# Patient Record
Sex: MALE | Race: WHITE | Employment: FULL TIME | URBAN - METROPOLITAN AREA
[De-identification: names, ages, dates, MRNs, and addresses within clinical notes are randomized per-mention and may not be internally consistent; named-entity substitution may affect disease eponyms.]

---

## 2017-08-21 ENCOUNTER — OFFICE VISIT (OUTPATIENT)
Dept: FAMILY MEDICINE CLINIC | Age: 47
End: 2017-08-21

## 2017-08-21 VITALS
SYSTOLIC BLOOD PRESSURE: 134 MMHG | HEART RATE: 67 BPM | DIASTOLIC BLOOD PRESSURE: 86 MMHG | HEIGHT: 68 IN | BODY MASS INDEX: 31.81 KG/M2 | WEIGHT: 209.9 LBS | OXYGEN SATURATION: 98 %

## 2017-08-21 DIAGNOSIS — F41.9 ANXIETY: ICD-10-CM

## 2017-08-21 DIAGNOSIS — F33.42 RECURRENT MAJOR DEPRESSIVE DISORDER, IN FULL REMISSION (HCC): ICD-10-CM

## 2017-08-21 DIAGNOSIS — Z00.00 HEALTHCARE MAINTENANCE: Primary | ICD-10-CM

## 2017-08-21 DIAGNOSIS — F51.01 PRIMARY INSOMNIA: ICD-10-CM

## 2017-08-21 DIAGNOSIS — Z00.00 HEALTHCARE MAINTENANCE: ICD-10-CM

## 2017-08-21 LAB
ANION GAP SERPL CALCULATED.3IONS-SCNC: 15 MMOL/L (ref 3–16)
BUN BLDV-MCNC: 21 MG/DL (ref 7–20)
CALCIUM SERPL-MCNC: 9.5 MG/DL (ref 8.3–10.6)
CHLORIDE BLD-SCNC: 102 MMOL/L (ref 99–110)
CHOLESTEROL, TOTAL: 226 MG/DL (ref 0–199)
CO2: 25 MMOL/L (ref 21–32)
CREAT SERPL-MCNC: 1 MG/DL (ref 0.9–1.3)
GFR AFRICAN AMERICAN: >60
GFR NON-AFRICAN AMERICAN: >60
GLUCOSE BLD-MCNC: 86 MG/DL (ref 70–99)
HDLC SERPL-MCNC: 51 MG/DL (ref 40–60)
LDL CHOLESTEROL CALCULATED: 133 MG/DL
POTASSIUM SERPL-SCNC: 4.7 MMOL/L (ref 3.5–5.1)
SODIUM BLD-SCNC: 142 MMOL/L (ref 136–145)
TRIGL SERPL-MCNC: 212 MG/DL (ref 0–150)
VLDLC SERPL CALC-MCNC: 42 MG/DL

## 2017-08-21 PROCEDURE — 99386 PREV VISIT NEW AGE 40-64: CPT | Performed by: NURSE PRACTITIONER

## 2017-08-21 RX ORDER — DESVENLAFAXINE 50 MG/1
50 TABLET, EXTENDED RELEASE ORAL DAILY
COMMUNITY

## 2017-08-21 RX ORDER — CLONAZEPAM 0.5 MG/1
TABLET ORAL DAILY
COMMUNITY
Start: 2017-07-19

## 2017-08-21 ASSESSMENT — ENCOUNTER SYMPTOMS
SINUS PRESSURE: 0
DIARRHEA: 0
CHEST TIGHTNESS: 0
APNEA: 0
COUGH: 0
ABDOMINAL DISTENTION: 0
BLOOD IN STOOL: 0
SHORTNESS OF BREATH: 0
VOMITING: 0
EYE PAIN: 0
BACK PAIN: 0
CONSTIPATION: 0
NAUSEA: 0
EYE REDNESS: 0
RHINORRHEA: 0
WHEEZING: 0
ABDOMINAL PAIN: 0

## 2017-08-22 LAB
ESTIMATED AVERAGE GLUCOSE: 114 MG/DL
HBA1C MFR BLD: 5.6 %
HIV-1 AND HIV-2 ANTIBODIES: NORMAL

## 2017-08-28 ENCOUNTER — TELEPHONE (OUTPATIENT)
Dept: FAMILY MEDICINE CLINIC | Age: 47
End: 2017-08-28

## 2018-08-15 LAB
ALBUMIN: 5
ALP BLD-CCNC: 68 U/L
ALT SERPL-CCNC: 39 U/L
AST SERPL-CCNC: 39 U/L
AVERAGE GLUCOSE: NORMAL
CREATININE: 1.2 MG/DL
GAMMA GLUTAMYL TRANSFERASE: 32 U/L (ref 18–76)
GLUCOSE BLD-MCNC: 69 MG/DL
HBA1C MFR BLD: 5.3 %

## 2018-08-20 ENCOUNTER — OFFICE VISIT (OUTPATIENT)
Dept: FAMILY MEDICINE CLINIC | Age: 48
End: 2018-08-20

## 2018-08-20 VITALS
HEIGHT: 68 IN | DIASTOLIC BLOOD PRESSURE: 88 MMHG | BODY MASS INDEX: 31.04 KG/M2 | WEIGHT: 204.8 LBS | OXYGEN SATURATION: 99 % | HEART RATE: 64 BPM | SYSTOLIC BLOOD PRESSURE: 132 MMHG

## 2018-08-20 DIAGNOSIS — Z00.00 WELL ADULT EXAM: Primary | ICD-10-CM

## 2018-08-20 PROCEDURE — 99396 PREV VISIT EST AGE 40-64: CPT | Performed by: FAMILY MEDICINE

## 2018-08-20 ASSESSMENT — ENCOUNTER SYMPTOMS: RESPIRATORY NEGATIVE: 1

## 2018-08-20 NOTE — PROGRESS NOTES
Diagnosis Orders   1. Well adult exam            Plan:       Will work on diet and exercise   no high risk activities    Alen Concepcion MD

## 2018-08-23 ENCOUNTER — TELEPHONE (OUTPATIENT)
Dept: FAMILY MEDICINE CLINIC | Age: 48
End: 2018-08-23

## 2020-06-18 ENCOUNTER — TELEPHONE (OUTPATIENT)
Dept: ADMINISTRATIVE | Age: 50
End: 2020-06-18

## 2020-06-30 ENCOUNTER — TELEPHONE (OUTPATIENT)
Dept: FAMILY MEDICINE CLINIC | Age: 50
End: 2020-06-30

## 2020-06-30 NOTE — TELEPHONE ENCOUNTER
[facility] received a call from:    Name of Caller: Carol Jorge    Relationship to patient: Self    Best contact number: [phone]    Reason for call: Patient would like to reschedule Physical appointment in the office.  Please advise

## 2020-07-06 ENCOUNTER — OFFICE VISIT (OUTPATIENT)
Dept: FAMILY MEDICINE CLINIC | Age: 50
End: 2020-07-06
Payer: COMMERCIAL

## 2020-07-06 VITALS
BODY MASS INDEX: 30.31 KG/M2 | WEIGHT: 200 LBS | HEIGHT: 68 IN | DIASTOLIC BLOOD PRESSURE: 88 MMHG | SYSTOLIC BLOOD PRESSURE: 128 MMHG | OXYGEN SATURATION: 98 % | HEART RATE: 80 BPM

## 2020-07-06 PROCEDURE — 99396 PREV VISIT EST AGE 40-64: CPT | Performed by: FAMILY MEDICINE

## 2020-07-06 ASSESSMENT — ENCOUNTER SYMPTOMS: RESPIRATORY NEGATIVE: 1

## 2020-07-06 NOTE — PROGRESS NOTES
Subjective:      Patient ID: Lauren Coop is a 48 y.o. male. HPI   Pt is a of 48 y.o. male comes in today with   Chief Complaint   Patient presents with    Annual Exam    Health Maintenance     Colonoscopy (due). Here for physical.  Eats a lot of red meat. Other than that has been ok. A lot of activity at work but no dedicated exercise. Review of Systems   Constitutional: Negative. Respiratory: Negative. Cardiovascular: Negative. Vitals:    07/06/20 1543   BP: 128/88   Site: Left Upper Arm   Position: Sitting   Cuff Size: Medium Adult   Pulse: 80   SpO2: 98%   Weight: 200 lb (90.7 kg)   Height: 5' 8\" (1.727 m)       Objective:   Physical Exam  Constitutional:       Appearance: He is well-developed. HENT:      Head: Normocephalic and atraumatic. Right Ear: Tympanic membrane, ear canal and external ear normal.      Left Ear: Tympanic membrane, ear canal and external ear normal.   Eyes:      General: No scleral icterus. Conjunctiva/sclera: Conjunctivae normal.   Neck:      Musculoskeletal: Normal range of motion and neck supple. Thyroid: No thyromegaly. Cardiovascular:      Rate and Rhythm: Normal rate and regular rhythm. Heart sounds: Normal heart sounds. No murmur. Pulmonary:      Effort: Pulmonary effort is normal.      Breath sounds: Normal breath sounds. No wheezing or rales. Abdominal:      General: Bowel sounds are normal. There is no distension. Palpations: Abdomen is soft. There is no hepatomegaly or splenomegaly. Tenderness: There is no abdominal tenderness. Skin:     General: Skin is warm and dry. Neurological:      Mental Status: He is alert and oriented to person, place, and time. Cranial Nerves: No cranial nerve deficit. Deep Tendon Reflexes: Reflexes are normal and symmetric. Psychiatric:         Behavior: Behavior normal.         Thought Content:  Thought content normal.         Judgment: Judgment normal. Assessment:       Diagnosis Orders   1. Annual physical exam  Lipid Panel    Comprehensive Metabolic Panel          Plan:      Discussed statin use if ldl still high. Diet not likely to change.           Talisha Gong MD

## 2020-07-07 DIAGNOSIS — Z00.00 ANNUAL PHYSICAL EXAM: ICD-10-CM

## 2020-07-08 LAB
A/G RATIO: 2.1 (ref 1.1–2.2)
ALBUMIN SERPL-MCNC: 4.8 G/DL (ref 3.4–5)
ALP BLD-CCNC: 62 U/L (ref 40–129)
ALT SERPL-CCNC: 23 U/L (ref 10–40)
ANION GAP SERPL CALCULATED.3IONS-SCNC: 10 MMOL/L (ref 3–16)
AST SERPL-CCNC: 22 U/L (ref 15–37)
BILIRUB SERPL-MCNC: 0.5 MG/DL (ref 0–1)
BUN BLDV-MCNC: 25 MG/DL (ref 7–20)
CALCIUM SERPL-MCNC: 9.7 MG/DL (ref 8.3–10.6)
CHLORIDE BLD-SCNC: 101 MMOL/L (ref 99–110)
CHOLESTEROL, TOTAL: 225 MG/DL (ref 0–199)
CO2: 26 MMOL/L (ref 21–32)
CREAT SERPL-MCNC: 1 MG/DL (ref 0.9–1.3)
GFR AFRICAN AMERICAN: >60
GFR NON-AFRICAN AMERICAN: >60
GLOBULIN: 2.3 G/DL
GLUCOSE BLD-MCNC: 84 MG/DL (ref 70–99)
HDLC SERPL-MCNC: 53 MG/DL (ref 40–60)
LDL CHOLESTEROL CALCULATED: 139 MG/DL
POTASSIUM SERPL-SCNC: 4.5 MMOL/L (ref 3.5–5.1)
SODIUM BLD-SCNC: 137 MMOL/L (ref 136–145)
TOTAL PROTEIN: 7.1 G/DL (ref 6.4–8.2)
TRIGL SERPL-MCNC: 166 MG/DL (ref 0–150)
VLDLC SERPL CALC-MCNC: 33 MG/DL

## 2020-08-31 ENCOUNTER — TELEPHONE (OUTPATIENT)
Dept: FAMILY MEDICINE CLINIC | Age: 50
End: 2020-08-31

## 2021-10-21 ENCOUNTER — TELEPHONE (OUTPATIENT)
Dept: ORTHOPEDIC SURGERY | Age: 51
End: 2021-10-21

## 2021-10-21 ENCOUNTER — OFFICE VISIT (OUTPATIENT)
Dept: ORTHOPEDIC SURGERY | Age: 51
End: 2021-10-21
Payer: COMMERCIAL

## 2021-10-21 VITALS — HEIGHT: 68 IN | WEIGHT: 199.96 LBS | BODY MASS INDEX: 30.31 KG/M2

## 2021-10-21 DIAGNOSIS — G89.29 CHRONIC RIGHT HIP PAIN: ICD-10-CM

## 2021-10-21 DIAGNOSIS — M25.551 CHRONIC RIGHT HIP PAIN: ICD-10-CM

## 2021-10-21 DIAGNOSIS — M84.353A STRESS FRACTURE OF NECK OF FEMUR, INITIAL ENCOUNTER: Primary | ICD-10-CM

## 2021-10-21 PROCEDURE — 99203 OFFICE O/P NEW LOW 30 MIN: CPT | Performed by: INTERNAL MEDICINE

## 2021-10-21 PROCEDURE — G8427 DOCREV CUR MEDS BY ELIG CLIN: HCPCS | Performed by: INTERNAL MEDICINE

## 2021-10-21 PROCEDURE — 3017F COLORECTAL CA SCREEN DOC REV: CPT | Performed by: INTERNAL MEDICINE

## 2021-10-21 PROCEDURE — G8484 FLU IMMUNIZE NO ADMIN: HCPCS | Performed by: INTERNAL MEDICINE

## 2021-10-21 PROCEDURE — G8417 CALC BMI ABV UP PARAM F/U: HCPCS | Performed by: INTERNAL MEDICINE

## 2021-10-21 PROCEDURE — 1036F TOBACCO NON-USER: CPT | Performed by: INTERNAL MEDICINE

## 2021-10-21 NOTE — TELEPHONE ENCOUNTER
Called. L/m on v/m  Can be scheduled for 10/25 at [facility] or 10/27 at [facility].

## 2021-10-21 NOTE — TELEPHONE ENCOUNTER
The patient was wondering if he can get in sooner than his 27th appt. He was referred by Dr. Divya Horn.

## 2021-10-25 NOTE — PROGRESS NOTES
Chief Complaint:   Chief Complaint   Patient presents with    Hip Pain     NP Right hip pain/injury; femoral neck fx          History of Present Illness:       Patient is a 46 y.o. male presents with the above complaint. The symptoms began 7/11/2021 and started with an injury while waterskiing. At that time he was diagnosed with a nondisplaced fracture posterior lip of the right acetabulum by CT scan and injury consistent with a posterior hip subluxation event. Over time he noted gradual improvement of symptoms but increasing pain prompted MRI evaluation 10/11/2021. Findings were significant for subcortical stress/insufficiency fracture superior lateral femoral head/neck. He has been on crutches since that time but unfortunately his symptoms of pain remain problematic. He has been advised by his treating physician to remain nonweightbearing with crutches for an additional 5 weeks and consider surgical fixation as a treatment option if symptoms show no appreciable improvement. The patient describes a aching, throbbing pain that does not radiate. The symptoms are constant  and are show no change since the onset. Pain localizesl ateral and  does not seems to follow  provacative pattern suggestive of greater trochanteric pain syndrome. There are not mechanical symptoms that suggest a labral injury. The patient admits to subjective instability about the hip and admits to new onset or progressive weakness of the lower extremity. Pain level 5    The patient denies a pattern of activity related swelling. Treatment to date: Protected weightbearing with crutches    There is no prior history of autoimmune disease, autoimmune disease, or crystal arthropathy.      Past Medical History:        Past Medical History:   Diagnosis Date    History of severe allergy     3 shots weekly         Past Surgical History:   Procedure Laterality Date    HAND SURGERY      right hand    SEPTOPLASTY      TONSILLECTOMY AND ADENOIDECTOMY           Present Medications:         Current Outpatient Medications   Medication Sig Dispense Refill    clonazePAM (KLONOPIN) 0.5 MG tablet daily       desvenlafaxine succinate (PRISTIQ) 50 MG TB24 extended release tablet Take 50 mg by mouth daily       No current facility-administered medications for this visit. Allergies:      No Known Allergies     Social History:         Social History     Socioeconomic History    Marital status:      Spouse name: Not on file    Number of children: Not on file    Years of education: Not on file    Highest education level: Not on file   Occupational History    Not on file   Tobacco Use    Smoking status: Former Smoker     Quit date: 2/10/2011     Years since quitting: 10.7    Smokeless tobacco: Former User     Types: [address] date: 1/10/2012   Substance and Sexual Activity    Alcohol use: Yes     Alcohol/week: 12.0 standard drinks     Types: 12 Cans of beer per week     Comment: keeping under 2 per day    Drug use: No    Sexual activity: Yes     Partners: Female     Comment: wife   Other Topics Concern    Not on file   Social History Narrative    Not on file     Social Determinants of Health     Financial Resource Strain:     Difficulty of Paying Living Expenses:    Food Insecurity:     Worried About Running Out of Food in the Last Year:     Ran Out of Food in the Last Year:    Transportation Needs:     Lack of Transportation (Medical):      Lack of Transportation (Non-Medical):    Physical Activity:     Days of Exercise per Week:     Minutes of Exercise per Session:    Stress:     Feeling of Stress :    Social Connections:     Frequency of Communication with Friends and Family:     Frequency of Social Gatherings with Friends and Family:     Attends Faith Services:     Active Member of Clubs or Organizations:     Attends Club or Organization Meetings:     Marital Status:    Intimate Partner Violence:     Fear of Current or Ex-Partner:     Emotionally Abused:     Physically Abused:     Sexually Abused:         Review of Symptoms:    Pertinent items are noted in HPI    Review of systems reviewed from Patient History Form dated on today's date and   available in the patient's chart under the Media tab. Vital Signs: There were no vitals filed for this visit. General Exam:     Constitutional: Patient is adequately groomed with no evidence of malnutrition  Mental Status: The patient is oriented to time, place and person. The patient's mood and affect are appropriate. Vascular: Examination reveals no swelling or calf tenderness. Peripheral pulses are palpable and 2+. Lymphatics: no lymphadenopathy of the inguinal region or lower extremity      Physical Exam: right hip      Primary Exam:    Inspection: No deformity atrophy appreciable effusion      Palpation: No focal tenderness      Range of Motion: Mild pain inhibition global restriction      Strength: Normal with gravity active hip flexion low-grade discomfort      Special Tests: SLR negative      Skin: There are no rashes, ulcerations or lesions. Gait: Purposely not assessed      Reflex intact lower     Additional Comments:        Additional Examinations:           Left Lower Extremity: Examination of the left hip does not show any tenderness, deformity or injury. Range of motion is unremarkable. There is no gross instability. There are no rashes, ulcerations or lesions. Strength and tone are normal.   Neurolgic -Light touch sensation and manual muscle testing normal L2-S1. No fasiculations. Pattella tendon and Achilles tendon reflexes +2 bilaterally.   Seated SLR negative        Office Imaging Results/Procedures PerformedToday:            Office Procedures:     Orders Placed This Encounter   Procedures   Arabella Alba MD Orthopaedics and Sports Medicine, [facility]     Referral Priority:   Routine     Referral Type:   Eval and Treat     Referral Reason:   Specialty Services Required     Referred to Provider:   Denver Pulliam MD     Requested Specialty:   Orthopedic Surgery     Number of Visits Requested:   1           Other Outside Imaging and Testing Personally Reviewed:    No results found. Assessment   Impression: . Encounter Diagnoses   Name Primary?  Stress fracture of neck of femur, initial encounter Yes    Chronic right hip pain       Tension side stress fracture of proximal femur          Plan:       Recommend he proceed with fixation of the stress fracture for definitive management and discuss this with his treating physician-Dr. Reyes Tintah  Continue toe-touch weightbearing unload right lower extremity  Medical pain management as per previous    Given the location of the stress fracture and longevity of symptoms and continued disability I would recommend he proceed with definitive intervention. He will consider this as a treatment option and if he decides to proceed with more aggressive intervention he could consider consultation with our hip preservation team at his discretion. The nature of the finding, probable diagnosis and likely treatment was thoroughly discussed with the patient. The options, risks, complications, alternative treatment as well as some of the differential diagnosis was discussed. The patient was thoroughly informed and all questions were answered. the patient indicated understanding and satisfaction with the discussion. Orders:        Orders Placed This Encounter   Procedures   Josh Enriquez MD Orthopaedics and Sports Medicine, [facility]     Referral Priority:   Routine     Referral Type:   Eval and Treat     Referral Reason:   Specialty Services Required     Referred to Provider:   Denver Pulliam MD     Requested Specialty:   Orthopedic Surgery     Number of Visits Requested:   1           Disclaimer:     \"This note was dictated with voice recognition software. Though review and correction are routine, we apologize for any errors. \"

## 2021-10-26 ENCOUNTER — OFFICE VISIT (OUTPATIENT)
Dept: FAMILY MEDICINE CLINIC | Age: 51
End: 2021-10-26
Payer: COMMERCIAL

## 2021-10-26 VITALS
HEIGHT: 68 IN | SYSTOLIC BLOOD PRESSURE: 128 MMHG | WEIGHT: 210 LBS | BODY MASS INDEX: 31.83 KG/M2 | OXYGEN SATURATION: 99 % | DIASTOLIC BLOOD PRESSURE: 82 MMHG | HEART RATE: 85 BPM

## 2021-10-26 DIAGNOSIS — Z01.818 PREOP EXAMINATION: Primary | ICD-10-CM

## 2021-10-26 DIAGNOSIS — F41.9 ANXIETY: ICD-10-CM

## 2021-10-26 DIAGNOSIS — E66.09 CLASS 1 OBESITY DUE TO EXCESS CALORIES WITHOUT SERIOUS COMORBIDITY WITH BODY MASS INDEX (BMI) OF 31.0 TO 31.9 IN ADULT: ICD-10-CM

## 2021-10-26 PROCEDURE — G8417 CALC BMI ABV UP PARAM F/U: HCPCS | Performed by: FAMILY MEDICINE

## 2021-10-26 PROCEDURE — 99213 OFFICE O/P EST LOW 20 MIN: CPT | Performed by: FAMILY MEDICINE

## 2021-10-26 PROCEDURE — 93000 ELECTROCARDIOGRAM COMPLETE: CPT | Performed by: FAMILY MEDICINE

## 2021-10-26 PROCEDURE — G8484 FLU IMMUNIZE NO ADMIN: HCPCS | Performed by: FAMILY MEDICINE

## 2021-10-26 PROCEDURE — G8427 DOCREV CUR MEDS BY ELIG CLIN: HCPCS | Performed by: FAMILY MEDICINE

## 2021-10-26 SDOH — ECONOMIC STABILITY: FOOD INSECURITY: WITHIN THE PAST 12 MONTHS, THE FOOD YOU BOUGHT JUST DIDN'T LAST AND YOU DIDN'T HAVE MONEY TO GET MORE.: NEVER TRUE

## 2021-10-26 SDOH — ECONOMIC STABILITY: FOOD INSECURITY: WITHIN THE PAST 12 MONTHS, YOU WORRIED THAT YOUR FOOD WOULD RUN OUT BEFORE YOU GOT MONEY TO BUY MORE.: NEVER TRUE

## 2021-10-26 ASSESSMENT — SOCIAL DETERMINANTS OF HEALTH (SDOH): HOW HARD IS IT FOR YOU TO PAY FOR THE VERY BASICS LIKE FOOD, HOUSING, MEDICAL CARE, AND HEATING?: NOT HARD AT ALL

## 2021-10-26 ASSESSMENT — ENCOUNTER SYMPTOMS: RESPIRATORY NEGATIVE: 1

## 2021-10-26 NOTE — PROGRESS NOTES
Rigo Gordon (:  1970) is a 46 y.o. male,Established patient, here for evaluation of the following chief complaint(s):  Pre-op Exam (Patient is scheduled for right hip surgery on 21 at [facility] with Dr. Yanet Mcdonald.)         ASSESSMENT/PLAN:  Lauren Ramos was seen today for pre-op exam.    Diagnoses and all orders for this visit:    Preop examination  -     EKG 12 Lead  Medically patient is at acceptable risk to undergo planned surgery. Anxiety  The current medical regimen is effective;  continue present plan and medications. Class 1 obesity due to excess calories without serious comorbidity with body mass index (BMI) of 31.0 to 31.9 in adult  -     EKG 12 Lead  Stable. Once able to exercise will help. No follow-ups on file. Subjective   SUBJECTIVE/OBJECTIVE:  HPI   Pt is a of 46 y.o. male comes in today with   Chief Complaint   Patient presents with    Pre-op Exam     Patient is scheduled for right hip surgery on 21 at [facility] with Dr. Yanet Mcdonald. preop for right hip surgery  No personal or family history of adverse reaction to anesthesia or bleeding tendency. Vitals:    10/26/21 1333   BP: 128/82   Site: Right Upper Arm   Position: Sitting   Cuff Size: Medium Adult   Pulse: 85   SpO2: 99%   Weight: 210 lb (95.3 kg)   Height: 5' 7.99\" (1.727 m)     No Known Allergies    Current Outpatient Medications on File Prior to Visit   Medication Sig Dispense Refill    clonazePAM (KLONOPIN) 0.5 MG tablet daily       desvenlafaxine succinate (PRISTIQ) 50 MG TB24 extended release tablet Take 50 mg by mouth daily       No current facility-administered medications on file prior to visit.        Past Medical History:   Diagnosis Date    History of severe allergy     3 shots weekly       Past Surgical History:   Procedure Laterality Date    HAND SURGERY      right hand    SEPTOPLASTY      TONSILLECTOMY AND ADENOIDECTOMY         Social History Socioeconomic History    Marital status:      Spouse name: None    Number of children: None    Years of education: None    Highest education level: None   Occupational History    None   Tobacco Use    Smoking status: Former Smoker     Quit date: 2/10/2011     Years since quitting: 10.7    Smokeless tobacco: Former User     Types: [address] date: 1/10/2012   Substance and Sexual Activity    Alcohol use: Yes     Alcohol/week: 12.0 standard drinks     Types: 12 Cans of beer per week     Comment: keeping under 2 per day    Drug use: No    Sexual activity: Yes     Partners: Female     Comment: wife   Other Topics Concern    None   Social History Narrative    None     Social Determinants of Health     Financial Resource Strain: Low Risk     Difficulty of Paying Living Expenses: Not hard at all   Food Insecurity: No Food Insecurity    Worried About Running Out of Food in the Last Year: Never true    Reji of Food in the Last Year: Never true   Transportation Needs:     Lack of Transportation (Medical):  Lack of Transportation (Non-Medical):    Physical Activity:     Days of Exercise per Week:     Minutes of Exercise per Session:    Stress:     Feeling of Stress :    Social Connections:     Frequency of Communication with Friends and Family:     Frequency of Social Gatherings with Friends and Family:     Attends Gnosticism Services:     Active Member of Clubs or Organizations:     Attends Club or Organization Meetings:     Marital Status:    Intimate Partner Violence:     Fear of Current or Ex-Partner:     Emotionally Abused:     Physically Abused:     Sexually Abused:        Social History     Substance and Sexual Activity   Drug Use No       Family History   Problem Relation Age of Onset    High Blood Pressure Mother     High Blood Pressure Father         Review of Systems   Constitutional: Negative. Respiratory: Negative. Cardiovascular: Negative.     Hematological: Does not bruise/bleed easily. Objective   Physical Exam  Constitutional:       General: He is not in acute distress. Appearance: He is well-developed. He is not diaphoretic. HENT:      Head: Normocephalic and atraumatic. Eyes:      General: No scleral icterus. Neck:      Thyroid: No thyromegaly. Trachea: No tracheal deviation. Cardiovascular:      Rate and Rhythm: Normal rate and regular rhythm. Heart sounds: Normal heart sounds. No murmur heard. Pulmonary:      Effort: Pulmonary effort is normal.      Breath sounds: Normal breath sounds. Musculoskeletal:      Cervical back: Normal range of motion and neck supple. Lymphadenopathy:      Head:      Right side of head: No submental or submandibular adenopathy. Left side of head: No submental or submandibular adenopathy. Cervical: No cervical adenopathy. Skin:     General: Skin is warm and dry. Neurological:      Mental Status: He is alert and oriented to person, place, and time. Cranial Nerves: No cranial nerve deficit. Psychiatric:         Behavior: Behavior normal.         Thought Content: Thought content normal.         Judgment: Judgment normal.              An electronic signature was used to authenticate this note.     --Miguel Silveira MD

## 2021-11-02 ENCOUNTER — TELEPHONE (OUTPATIENT)
Dept: FAMILY MEDICINE CLINIC | Age: 51
End: 2021-11-02

## 2021-11-02 NOTE — TELEPHONE ENCOUNTER
Joy from [facility] called to have the pt pre-op physical faxed over to them.      Please advise     Fax # [phone]

## 2021-12-15 ENCOUNTER — CLINICAL DOCUMENTATION (OUTPATIENT)
Dept: OTHER | Age: 51
End: 2021-12-15

## 2022-10-24 ENCOUNTER — OFFICE VISIT (OUTPATIENT)
Dept: FAMILY MEDICINE CLINIC | Age: 52
End: 2022-10-24
Payer: COMMERCIAL

## 2022-10-24 VITALS
HEART RATE: 72 BPM | HEIGHT: 68 IN | SYSTOLIC BLOOD PRESSURE: 130 MMHG | BODY MASS INDEX: 31.52 KG/M2 | DIASTOLIC BLOOD PRESSURE: 84 MMHG | OXYGEN SATURATION: 98 % | WEIGHT: 208 LBS

## 2022-10-24 DIAGNOSIS — R06.83 SNORING: Primary | ICD-10-CM

## 2022-10-24 PROCEDURE — 99212 OFFICE O/P EST SF 10 MIN: CPT | Performed by: FAMILY MEDICINE

## 2022-10-24 PROCEDURE — 1036F TOBACCO NON-USER: CPT | Performed by: FAMILY MEDICINE

## 2022-10-24 PROCEDURE — G8417 CALC BMI ABV UP PARAM F/U: HCPCS | Performed by: FAMILY MEDICINE

## 2022-10-24 PROCEDURE — 3017F COLORECTAL CA SCREEN DOC REV: CPT | Performed by: FAMILY MEDICINE

## 2022-10-24 PROCEDURE — G8484 FLU IMMUNIZE NO ADMIN: HCPCS | Performed by: FAMILY MEDICINE

## 2022-10-24 PROCEDURE — G8427 DOCREV CUR MEDS BY ELIG CLIN: HCPCS | Performed by: FAMILY MEDICINE

## 2022-10-24 RX ORDER — NALTREXONE HYDROCHLORIDE 50 MG/1
50 TABLET, FILM COATED ORAL
COMMUNITY
Start: 2022-09-07

## 2022-10-24 ASSESSMENT — PATIENT HEALTH QUESTIONNAIRE - PHQ9
8. MOVING OR SPEAKING SO SLOWLY THAT OTHER PEOPLE COULD HAVE NOTICED. OR THE OPPOSITE, BEING SO FIGETY OR RESTLESS THAT YOU HAVE BEEN MOVING AROUND A LOT MORE THAN USUAL: 0
10. IF YOU CHECKED OFF ANY PROBLEMS, HOW DIFFICULT HAVE THESE PROBLEMS MADE IT FOR YOU TO DO YOUR WORK, TAKE CARE OF THINGS AT HOME, OR GET ALONG WITH OTHER PEOPLE: 0
SUM OF ALL RESPONSES TO PHQ QUESTIONS 1-9: 1
SUM OF ALL RESPONSES TO PHQ QUESTIONS 1-9: 1
9. THOUGHTS THAT YOU WOULD BE BETTER OFF DEAD, OR OF HURTING YOURSELF: 0
1. LITTLE INTEREST OR PLEASURE IN DOING THINGS: 0
3. TROUBLE FALLING OR STAYING ASLEEP: 1
SUM OF ALL RESPONSES TO PHQ9 QUESTIONS 1 & 2: 0
SUM OF ALL RESPONSES TO PHQ QUESTIONS 1-9: 1
4. FEELING TIRED OR HAVING LITTLE ENERGY: 0
2. FEELING DOWN, DEPRESSED OR HOPELESS: 0
SUM OF ALL RESPONSES TO PHQ QUESTIONS 1-9: 1
7. TROUBLE CONCENTRATING ON THINGS, SUCH AS READING THE NEWSPAPER OR WATCHING TELEVISION: 0
6. FEELING BAD ABOUT YOURSELF - OR THAT YOU ARE A FAILURE OR HAVE LET YOURSELF OR YOUR FAMILY DOWN: 0
5. POOR APPETITE OR OVEREATING: 0

## 2022-10-24 NOTE — PATIENT INSTRUCTIONS
Dr. Sidney Sánchez  [address] [place] [address], [address] [place] [address]  Get Directions  Tel: [phone]

## 2022-10-24 NOTE — PROGRESS NOTES
Lizzette Worthington (:  1970) is a 46 y.o. male,Established patient, here for evaluation of the following chief complaint(s):  Referral - General (At home sleep test)         ASSESSMENT/PLAN:  Tawnya Coburn was seen today for referral - general.    Diagnoses and all orders for this visit:    Snoring  Sounds like mod-severe so recommended follow up with sleep specialist     No follow-ups on file. Subjective   SUBJECTIVE/OBJECTIVE:  HPI  Pt is a of 46 y.o. male comes in today with   Chief Complaint   Patient presents with    Referral - General     At home sleep test     Snores and has apneic spells. Significant daytime somnolence. Vitals:    10/24/22 1500   BP: 130/84   Site: Right Upper Arm   Position: Sitting   Cuff Size: Medium Adult   Pulse: 72   SpO2: 98%   Weight: 208 lb (94.3 kg)   Height: 5' 8\" (1.727 m)      Review of Systems       Objective   Physical Exam         An electronic signature was used to authenticate this note.     --Aleksander Araiza MD

## 2022-11-03 ENCOUNTER — OFFICE VISIT (OUTPATIENT)
Dept: FAMILY MEDICINE CLINIC | Age: 52
End: 2022-11-03
Payer: COMMERCIAL

## 2022-11-03 VITALS
HEIGHT: 68 IN | OXYGEN SATURATION: 98 % | WEIGHT: 206 LBS | DIASTOLIC BLOOD PRESSURE: 82 MMHG | SYSTOLIC BLOOD PRESSURE: 138 MMHG | HEART RATE: 90 BPM | BODY MASS INDEX: 31.22 KG/M2

## 2022-11-03 DIAGNOSIS — G47.10 HYPERSOMNIA: Primary | ICD-10-CM

## 2022-11-03 DIAGNOSIS — R06.81 WITNESSED EPISODE OF APNEA: ICD-10-CM

## 2022-11-03 PROCEDURE — 1036F TOBACCO NON-USER: CPT | Performed by: STUDENT IN AN ORGANIZED HEALTH CARE EDUCATION/TRAINING PROGRAM

## 2022-11-03 PROCEDURE — 3017F COLORECTAL CA SCREEN DOC REV: CPT | Performed by: STUDENT IN AN ORGANIZED HEALTH CARE EDUCATION/TRAINING PROGRAM

## 2022-11-03 PROCEDURE — G8417 CALC BMI ABV UP PARAM F/U: HCPCS | Performed by: STUDENT IN AN ORGANIZED HEALTH CARE EDUCATION/TRAINING PROGRAM

## 2022-11-03 PROCEDURE — 99214 OFFICE O/P EST MOD 30 MIN: CPT | Performed by: STUDENT IN AN ORGANIZED HEALTH CARE EDUCATION/TRAINING PROGRAM

## 2022-11-03 PROCEDURE — G8427 DOCREV CUR MEDS BY ELIG CLIN: HCPCS | Performed by: STUDENT IN AN ORGANIZED HEALTH CARE EDUCATION/TRAINING PROGRAM

## 2022-11-03 PROCEDURE — G8482 FLU IMMUNIZE ORDER/ADMIN: HCPCS | Performed by: STUDENT IN AN ORGANIZED HEALTH CARE EDUCATION/TRAINING PROGRAM

## 2022-11-03 SDOH — ECONOMIC STABILITY: FOOD INSECURITY: WITHIN THE PAST 12 MONTHS, YOU WORRIED THAT YOUR FOOD WOULD RUN OUT BEFORE YOU GOT MONEY TO BUY MORE.: NEVER TRUE

## 2022-11-03 SDOH — ECONOMIC STABILITY: FOOD INSECURITY: WITHIN THE PAST 12 MONTHS, THE FOOD YOU BOUGHT JUST DIDN'T LAST AND YOU DIDN'T HAVE MONEY TO GET MORE.: NEVER TRUE

## 2022-11-03 ASSESSMENT — SLEEP AND FATIGUE QUESTIONNAIRES
HOW LIKELY ARE YOU TO NOD OFF OR FALL ASLEEP WHILE SITTING AND TALKING TO SOMEONE: 1
HOW LIKELY ARE YOU TO NOD OFF OR FALL ASLEEP WHILE LYING DOWN TO REST IN THE AFTERNOON WHEN CIRCUMSTANCES PERMIT: 2
HOW LIKELY ARE YOU TO NOD OFF OR FALL ASLEEP WHILE SITTING AND READING: 2
HOW LIKELY ARE YOU TO NOD OFF OR FALL ASLEEP IN A CAR, WHILE STOPPED FOR A FEW MINUTES IN TRAFFIC: 0
HOW LIKELY ARE YOU TO NOD OFF OR FALL ASLEEP WHILE WATCHING TV: 2
HOW LIKELY ARE YOU TO NOD OFF OR FALL ASLEEP WHEN YOU ARE A PASSENGER IN A CAR FOR AN HOUR WITHOUT A BREAK: 1
HOW LIKELY ARE YOU TO NOD OFF OR FALL ASLEEP WHILE SITTING INACTIVE IN A PUBLIC PLACE: 1
ESS TOTAL SCORE: 10
HOW LIKELY ARE YOU TO NOD OFF OR FALL ASLEEP WHILE SITTING QUIETLY AFTER LUNCH WITHOUT ALCOHOL: 1

## 2022-11-03 ASSESSMENT — SOCIAL DETERMINANTS OF HEALTH (SDOH): HOW HARD IS IT FOR YOU TO PAY FOR THE VERY BASICS LIKE FOOD, HOUSING, MEDICAL CARE, AND HEATING?: NOT HARD AT ALL

## 2022-11-03 NOTE — PATIENT INSTRUCTIONS
Patient information on the evaluation procedure for sleep apnea:    1. You are going to have a portable sleep study: This is a home sleep study that will be done to see if you have obstructive sleep apnea. You will have a flow monitor on your nose, belt on your chest and a oxygen/heart rate monitor on your finger. Please do not wear nail polish on day of the study as it will interfere with the oxygen reading probe that is placed on your finger during the study. Once you receive the device, you will also receive instructions on how to put it on and turn it on. After 2 nights you will return it. 2. The sleep office will call you with the results a week after the study. If the study showed that you have obstructive sleep apnea you will be told of the next step in your care. Commonly the recommended treatment is CPAP Therapy. If you agree to this therapy and you receive your CPAP before your next appointment, please bring it with you to your first follow-up appointment. Patients who have obstructive sleep apnea on the sleep study and have chosen to try CPAP:  A home equipment company will contact you to set you up with a CPAP machine and fit you for a mask. Please bring your CPAP, the mask and all supplies including the electrical cord with you to all your first follow up appointments with the sleep physician    Please keep trying to use your CPAP until you have seen in the sleep office in follow up as a lot of the problems patients have with CPAP can be addressed and corrected by your sleep provider. Sleep center contact information:  [facility]: [phone]  [facility]: [phone]             What are the risk factors for Obstructive Sleep Apnea (LYN)? Obesity  Snoring  Daytime sleepiness  Increasing age  Male gender  Taking sedating medications  Alcohol use  Hypertension  Stroke  Diabetes  Smoking     What is LYN?   People with LYN experience recurrent episodes during sleep when their throat closes or significantly narrows and they cannot inhale air into their lungs. This happens because the muscles that normally hold the throat open during wakefulness relax during sleep and allow it to narrow. When the muscles relax too much, trying to inhale can cause the throat to completely close and air cannot pass at all for at least 10 seconds. This is an obstructive apnea. An obstructive hypopnea occurs when the throat is partially closed for at least 10 seconds and airflow is decreased so much that oxygen in the blood starts to be fall. LYN is measured by how many apneas and hypopneas we have per hour. This is called an Apnea Hypopnea Index (AHI). It is considered excessive to have more than 5 apneas or hypopneas per hour as this can be extremely disruptive to sleep and have significant effects on our health and well being. How does LYN affect me? LYN goes beyond affecting just our quality of sleep. It interferes with many other systems of our body. Increase in blood pressure  Worsened control of diabetes  Daytime sleepiness and fatigue  Irritability  Difficulty concentrating or remembering facts  Difficulty losing weight  Swelling in the legs  Frequent awakenings to urinate  Increased risk of stroke  Increased risk of irregular heart rhythm  Increased risk for cardiovascular disease  Decreased sexual drive  Morning headaches  Increased risk of motor vehicle accident    How is LYN treated? The first line of treatment for LYN is continuous positive airway pressure (CPAP). A CPAP device provides air though a mask that fits over your nose or mouth and nose. The CPAP provides enough airflow to prevent the airway from collapsing when sleeping. This air can be humidified for comfort. Newer masks fit comfortably over the nasal opening rather than over the nose. It is important that CPAP is used regularly each night for optimal results.   Patients should notice improvement in sleepiness within the first week or two. Several second line therapies exist for LYN. Behavioral therapy for LYN includes weight loss and positional therapy which is avoidance of sleeping on ones back. Significant improvement in LYN can occur with as little as 10% weight loss. Dental devices that hold the lower jaw or tongue forward during sleep can also relieve LYN. These devices are not always as effective as CPAP but are preferred by some patients. Surgical procedures are also options. But it is often hard to predict how effective a surgical treatment will be in reducing or eliminating sleep apnea. Additional Information  American Academy of Sleep Medicine (www.aasmnet. org)    [address] (www.sleepfoundation. org)    American Sleep Apnea Association ([place]. org)    National Heart, Lung, and Blood [place] (www.nhlbi.nih.gov)    [employer]pto[employer] (www.uptodate.com/patients)  Weight Loss      Weight Loss is an important part of treating obstructive sleep apnea. Being at a healthy weight is improtant to prevent and/or facilitate treatment of chronic conditions such as heart disease and diabetes in addition to obstructive sleep apnea. This is optimally achieved through a healthy diet and exercise program that can be maintained long term. Drowsy Driving Tips    These suggestions will help prevent you from the risk of drowsy driving. 1.  If you feel tired or drowsy do not drive. Sleepiness is a major cause of motor vehicle accidents and accounts for 40% of all fatal crashes reported on the [address]. No matter how much you think you can control sleepiness, you can't.    2. Ensure you follow your doctor's advice about the treatment for your sleep disorder. For example, if you have sleep apnea and use CPAP, ensure you use it fully the night before your trip. 3. Get a good night's sleep before driving.   Do not reduce your sleep time if you plan a long drive the next day. Get to bed early and do not stay up late packing. 4. Avoid alcohol both the night before your trip and the during your trip. Alcohol will disrupt sleep and make you more tired the next day. Sleepiness and alcohol are additive in increasing impairment of your driving ability. 5. Avoid any sedative medications, including sedative antihistamines that are often contained in cold or allergy medications, the night before you drive as they may have long lasting effects the next day. 6. Travel during non-sleeping hours. Accidents due to sleepiness are more common during the nighttime hours. 7. If sleepy, stop and rest.  Drink coffee, walk around or have a brief nap in your car if you are sleepy. Have a 10-15 minute break after every 2 hours of driving. 8. Drive with a . Share the driving. Relax in the back seat until it is your time to share the driving again.

## 2022-11-03 NOTE — PROGRESS NOTES
Chief Complaint   Patient presents with    New Patient     Referred by Dr. Webb Prior     Snoring     Intense snoring, waking up through the night, wife says he wakes up gasping for air        Axel Johnson is a 46 y.o. male who comes in with complaints of snoring and gasping for air. Onset of symptoms was several years ago and has gotten worse as he's gotten older and heavier. Pertinent PMHx includes: severe allergies, anxiety and depression. He goes to bed at 10-10:30pm. He falls asleep in 30 minutes most days. He awakens at 4:30am.    He awakens several times per night (every half hour from 1 am to wake up time). He does not use medication for sleep: however takes clonazepam in the AM. He does not take daytime naps no. He describes the symptoms as snoring, waking up gasping for air, witnessed apnea, daytime fatigue and sleepiness. He denies hallucinations or sleep paralysis. He has not fallen asleep while driving. He does not have symptoms that fulfill the criteria for restless legs syndrome. Previous evaluation and treatment has included: none. Caffeinated drinks/day: 1/4 coffee  Occupation: wine   Family hx of sleep disorders: father, brother and sister with LYN    DATA REVIEWED TODAY:  [place] & FOSQ Questionnaires       Sleep Medicine 11/3/2022   Sitting and reading 2   Watching TV 2   Sitting, inactive in a public place (e.g. a theatre or a meeting) 1   As a passenger in a car for an hour without a break 1   Lying down to rest in the afternoon when circumstances permit 2   Sitting and talking to someone 1   Sitting quietly after a lunch without alcohol 1   In a car, while stopped for a few minutes in traffic 0   [place] Sleepiness Score 10   Neck circumference (Inches) 16.5     FOSQ total score:   Insomnia Severity Index score:       Physical Exam  Vitals:    11/03/22 1627   BP: 138/82   Pulse: 90   SpO2: 98%       General: alert and oriented, no apparent distress  HEENT:  Tongue: Ridging: does not   Overjet: does not  Retrognathia:does not  Tonsils: unknown  Mallampati Score: 4  Chest:  Auscultation: CTA, crackles, no; wheezing, no; nl excursion bilaterally  Heart:  RRR: No murmurs, rubs or gallops  Normal PMI  Skin: warm, no rashes    Ext:  Edema: No  Pulses: 2+ equal and bilateral  Neuro: alert and oriented         Assessment and Plan  The primary encounter diagnosis was Hypersomnia. A diagnosis of Witnessed episode of apnea was also pertinent to this visit. Orders Placed This Encounter   Procedures    Home Sleep Study          Mortimer Cross is a 46 y.o. male with pertinent PMHx of severe allergies, anxiety and depression, presenting with symptoms of hypersomnia and witnessed apneas. He has several risk factors for sleep disordered breathing including snoring, witnessed apneas, daytime sleepiness and fatigue. I will order a home sleep test to further evaluate this. We discussed treatment options and he is willing to consider CPAP treatment. If the study is positive for obstructive sleep apnea, we will therefore proceed with auto CPAP unless in lab PAP titration is indicated based on the sleep study. He understands that untreated LYN is associated with heart failure, atrial fibrillation, strokes, HTN, and impaired glucose tolerance. He should avoid respiratory suppressants as these can worsen sleep disordered breathing. He should never drive if drowsy and should pull over at a safe place if he becomes drowsy while driving. A handout on drowsy driving tips was given to the patient. Weight loss is associated with improvement in sleep disordered breathing. Mortimer Cross should exercise regularly and watch his diet. Return if symptoms worsen or fail to improve. . Will schedule patient for follow-up if test is positive for LYN.     Jair Sommer MD  1:51 PM

## 2023-02-01 ENCOUNTER — TELEPHONE (OUTPATIENT)
Dept: FAMILY MEDICINE CLINIC | Age: 53
End: 2023-02-01

## 2023-02-01 NOTE — TELEPHONE ENCOUNTER
Pt requesting a Rx to receive a Home Study Test from [facility]. Pt states it was discussed at his last appt that he needed to completed a Sleep Study. Pt was referred to Dr. Madelyne Duane by Dr. Margarito Callaway, but states it will cost him over a $1000 to get the equipment. Pt states their phone number is [phone] and [email or web address]. Pt states if he has a Rx, the machine may be covered. Please call and advise upon completion.

## 2023-05-04 ENCOUNTER — TELEPHONE (OUTPATIENT)
Dept: FAMILY MEDICINE CLINIC | Age: 53
End: 2023-05-04

## 2023-05-04 NOTE — TELEPHONE ENCOUNTER
----- Message from Hari Guerrero sent at 5/4/2023  1:15 PM EDT -----  Subject: Referral Request    Reason for referral request? Patient needs another prescription or   referral for a home sleep test sent to [facility]. The fax number is   [phone]. Please call patient when referral is faxed. Provider patient wants to be referred to(if known):     Provider Phone Number(if known):     Additional Information for Provider?   ---------------------------------------------------------------------------  --------------  [facility]    [phone]; OK to leave message on voicemail  ---------------------------------------------------------------------------  --------------

## 2023-06-27 ENCOUNTER — TELEPHONE (OUTPATIENT)
Dept: PULMONOLOGY | Age: 53
End: 2023-06-27

## 2023-07-26 ENCOUNTER — OFFICE VISIT (OUTPATIENT)
Dept: FAMILY MEDICINE CLINIC | Age: 53
End: 2023-07-26
Payer: COMMERCIAL

## 2023-07-26 VITALS
OXYGEN SATURATION: 98 % | WEIGHT: 203 LBS | HEART RATE: 83 BPM | DIASTOLIC BLOOD PRESSURE: 92 MMHG | HEIGHT: 68 IN | BODY MASS INDEX: 30.77 KG/M2 | SYSTOLIC BLOOD PRESSURE: 150 MMHG

## 2023-07-26 DIAGNOSIS — I10 BENIGN HYPERTENSION: ICD-10-CM

## 2023-07-26 DIAGNOSIS — E78.2 MIXED HYPERLIPIDEMIA: Primary | ICD-10-CM

## 2023-07-26 DIAGNOSIS — R06.83 SNORING: ICD-10-CM

## 2023-07-26 PROCEDURE — G8417 CALC BMI ABV UP PARAM F/U: HCPCS | Performed by: FAMILY MEDICINE

## 2023-07-26 PROCEDURE — 3078F DIAST BP <80 MM HG: CPT | Performed by: FAMILY MEDICINE

## 2023-07-26 PROCEDURE — 3074F SYST BP LT 130 MM HG: CPT | Performed by: FAMILY MEDICINE

## 2023-07-26 PROCEDURE — 1036F TOBACCO NON-USER: CPT | Performed by: FAMILY MEDICINE

## 2023-07-26 PROCEDURE — 99214 OFFICE O/P EST MOD 30 MIN: CPT | Performed by: FAMILY MEDICINE

## 2023-07-26 PROCEDURE — G8427 DOCREV CUR MEDS BY ELIG CLIN: HCPCS | Performed by: FAMILY MEDICINE

## 2023-07-26 PROCEDURE — 3017F COLORECTAL CA SCREEN DOC REV: CPT | Performed by: FAMILY MEDICINE

## 2023-07-26 RX ORDER — AMLODIPINE BESYLATE 5 MG/1
5 TABLET ORAL DAILY
Qty: 30 TABLET | Refills: 0 | Status: SHIPPED | OUTPATIENT
Start: 2023-07-26

## 2023-07-26 SDOH — ECONOMIC STABILITY: HOUSING INSECURITY
IN THE LAST 12 MONTHS, WAS THERE A TIME WHEN YOU DID NOT HAVE A STEADY PLACE TO SLEEP OR SLEPT IN A SHELTER (INCLUDING NOW)?: NO

## 2023-07-26 SDOH — ECONOMIC STABILITY: FOOD INSECURITY: WITHIN THE PAST 12 MONTHS, YOU WORRIED THAT YOUR FOOD WOULD RUN OUT BEFORE YOU GOT MONEY TO BUY MORE.: NEVER TRUE

## 2023-07-26 SDOH — ECONOMIC STABILITY: INCOME INSECURITY: HOW HARD IS IT FOR YOU TO PAY FOR THE VERY BASICS LIKE FOOD, HOUSING, MEDICAL CARE, AND HEATING?: NOT HARD AT ALL

## 2023-07-26 SDOH — ECONOMIC STABILITY: FOOD INSECURITY: WITHIN THE PAST 12 MONTHS, THE FOOD YOU BOUGHT JUST DIDN'T LAST AND YOU DIDN'T HAVE MONEY TO GET MORE.: NEVER TRUE

## 2023-07-26 NOTE — PROGRESS NOTES
Regla Escoto (:  1970) is a 48 y.o. male,Established patient, here for evaluation of the following chief complaint(s):  Follow-up (Patient would like to review wellness results for insurance.)         ASSESSMENT/PLAN:  Cassidy Rangel was seen today for follow-up. Diagnoses and all orders for this visit:    Benign hypertension  blood pressure up today but well controlled at home  Treatment of shirley will help  Continue amlodipine  Snoring  Encouraged follow up for this since not treated  Mixed hyperlipidemia  Not well controlled  Will work on therapeutic lifestyle changes   Other orders  -     amLODIPine (NORVASC) 5 MG tablet; Take 1 tablet by mouth daily         No follow-ups on file. Subjective   SUBJECTIVE/OBJECTIVE:  HPI  Pt is a of 48 y.o. male comes in today with   Chief Complaint   Patient presents with    Follow-up     Patient would like to review wellness results for insurance. Had life insurance blood work   LDL was 149. Rest of blood work and blood sugar was normal.  blood pressure was 130/80 at work physical.  Saw Dr. Johana Willingham in November but hasn't had sleep study done. Vitals:    23 1532   BP: (!) 150/92   Site: Left Upper Arm   Position: Sitting   Cuff Size: Medium Adult   Pulse: 83   SpO2: 98%   Weight: 203 lb (92.1 kg)   Height: 5' 8\" (1.727 m)        Past Medical History:Reviewed  Medications:Reviewed. No Known Allergies   Social hx:Reviewed. Social History     Tobacco Use   Smoking Status Former    Types: Cigarettes   Smokeless Tobacco Former    Types: Chew    Quit date: 1/10/2012        Review of Systems       Objective   Physical Exam  Constitutional:       Appearance: Normal appearance. Cardiovascular:      Rate and Rhythm: Normal rate and regular rhythm. Pulmonary:      Effort: Pulmonary effort is normal.      Breath sounds: Normal breath sounds. Neurological:      Mental Status: He is alert.             An electronic signature was used to authenticate this

## 2023-07-27 ASSESSMENT — PATIENT HEALTH QUESTIONNAIRE - PHQ9
SUM OF ALL RESPONSES TO PHQ QUESTIONS 1-9: 0
5. POOR APPETITE OR OVEREATING: 0
1. LITTLE INTEREST OR PLEASURE IN DOING THINGS: 0
SUM OF ALL RESPONSES TO PHQ QUESTIONS 1-9: 0
4. FEELING TIRED OR HAVING LITTLE ENERGY: 0
SUM OF ALL RESPONSES TO PHQ QUESTIONS 1-9: 0
SUM OF ALL RESPONSES TO PHQ QUESTIONS 1-9: 0
3. TROUBLE FALLING OR STAYING ASLEEP: 0
9. THOUGHTS THAT YOU WOULD BE BETTER OFF DEAD, OR OF HURTING YOURSELF: 0
10. IF YOU CHECKED OFF ANY PROBLEMS, HOW DIFFICULT HAVE THESE PROBLEMS MADE IT FOR YOU TO DO YOUR WORK, TAKE CARE OF THINGS AT HOME, OR GET ALONG WITH OTHER PEOPLE: 0
7. TROUBLE CONCENTRATING ON THINGS, SUCH AS READING THE NEWSPAPER OR WATCHING TELEVISION: 0
8. MOVING OR SPEAKING SO SLOWLY THAT OTHER PEOPLE COULD HAVE NOTICED. OR THE OPPOSITE, BEING SO FIGETY OR RESTLESS THAT YOU HAVE BEEN MOVING AROUND A LOT MORE THAN USUAL: 0
6. FEELING BAD ABOUT YOURSELF - OR THAT YOU ARE A FAILURE OR HAVE LET YOURSELF OR YOUR FAMILY DOWN: 0
2. FEELING DOWN, DEPRESSED OR HOPELESS: 0
SUM OF ALL RESPONSES TO PHQ9 QUESTIONS 1 & 2: 0

## 2024-05-13 ENCOUNTER — TELEPHONE (OUTPATIENT)
Dept: FAMILY MEDICINE CLINIC | Age: 54
End: 2024-05-13

## 2024-05-13 NOTE — TELEPHONE ENCOUNTER
Pt is having foot surgery on Monday and Iris with Dr. Cunningham wants to know if Pt should have a stronger blood thinner since both his sisters were tested and have Factor 5. She was wondering about Misael or Dari she can be contacted at [phone] also the fax is [phone].

## 2024-05-13 NOTE — TELEPHONE ENCOUNTER
I I left message on patient's machine to call back.  Pt probably due for follow up-can he be scheduled to discuss

## 2024-06-05 ASSESSMENT — PATIENT HEALTH QUESTIONNAIRE - PHQ9
6. FEELING BAD ABOUT YOURSELF - OR THAT YOU ARE A FAILURE OR HAVE LET YOURSELF OR YOUR FAMILY DOWN: NOT AT ALL
SUM OF ALL RESPONSES TO PHQ9 QUESTIONS 1 & 2: 4
SUM OF ALL RESPONSES TO PHQ QUESTIONS 1-9: 10
9. THOUGHTS THAT YOU WOULD BE BETTER OFF DEAD, OR OF HURTING YOURSELF: NOT AT ALL
1. LITTLE INTEREST OR PLEASURE IN DOING THINGS: MORE THAN HALF THE DAYS
1. LITTLE INTEREST OR PLEASURE IN DOING THINGS: MORE THAN HALF THE DAYS
8. MOVING OR SPEAKING SO SLOWLY THAT OTHER PEOPLE COULD HAVE NOTICED. OR THE OPPOSITE - BEING SO FIDGETY OR RESTLESS THAT YOU HAVE BEEN MOVING AROUND A LOT MORE THAN USUAL: NOT AT ALL
4. FEELING TIRED OR HAVING LITTLE ENERGY: MORE THAN HALF THE DAYS
9. THOUGHTS THAT YOU WOULD BE BETTER OFF DEAD, OR OF HURTING YOURSELF: NOT AT ALL
SUM OF ALL RESPONSES TO PHQ QUESTIONS 1-9: 10
5. POOR APPETITE OR OVEREATING: MORE THAN HALF THE DAYS
4. FEELING TIRED OR HAVING LITTLE ENERGY: MORE THAN HALF THE DAYS
2. FEELING DOWN, DEPRESSED OR HOPELESS: MORE THAN HALF THE DAYS
3. TROUBLE FALLING OR STAYING ASLEEP: MORE THAN HALF THE DAYS
2. FEELING DOWN, DEPRESSED OR HOPELESS: MORE THAN HALF THE DAYS
SUM OF ALL RESPONSES TO PHQ QUESTIONS 1-9: 10
3. TROUBLE FALLING OR STAYING ASLEEP: MORE THAN HALF THE DAYS
5. POOR APPETITE OR OVEREATING: MORE THAN HALF THE DAYS
10. IF YOU CHECKED OFF ANY PROBLEMS, HOW DIFFICULT HAVE THESE PROBLEMS MADE IT FOR YOU TO DO YOUR WORK, TAKE CARE OF THINGS AT HOME, OR GET ALONG WITH OTHER PEOPLE: SOMEWHAT DIFFICULT
SUM OF ALL RESPONSES TO PHQ QUESTIONS 1-9: 10
7. TROUBLE CONCENTRATING ON THINGS, SUCH AS READING THE NEWSPAPER OR WATCHING TELEVISION: NOT AT ALL
10. IF YOU CHECKED OFF ANY PROBLEMS, HOW DIFFICULT HAVE THESE PROBLEMS MADE IT FOR YOU TO DO YOUR WORK, TAKE CARE OF THINGS AT HOME, OR GET ALONG WITH OTHER PEOPLE: SOMEWHAT DIFFICULT
6. FEELING BAD ABOUT YOURSELF - OR THAT YOU ARE A FAILURE OR HAVE LET YOURSELF OR YOUR FAMILY DOWN: NOT AT ALL
7. TROUBLE CONCENTRATING ON THINGS, SUCH AS READING THE NEWSPAPER OR WATCHING TELEVISION: NOT AT ALL
8. MOVING OR SPEAKING SO SLOWLY THAT OTHER PEOPLE COULD HAVE NOTICED. OR THE OPPOSITE, BEING SO FIGETY OR RESTLESS THAT YOU HAVE BEEN MOVING AROUND A LOT MORE THAN USUAL: NOT AT ALL
SUM OF ALL RESPONSES TO PHQ QUESTIONS 1-9: 10

## 2024-06-06 ENCOUNTER — OFFICE VISIT (OUTPATIENT)
Dept: FAMILY MEDICINE CLINIC | Age: 54
End: 2024-06-06
Payer: COMMERCIAL

## 2024-06-06 VITALS
OXYGEN SATURATION: 98 % | HEART RATE: 90 BPM | BODY MASS INDEX: 31.22 KG/M2 | HEIGHT: 68 IN | WEIGHT: 206 LBS | SYSTOLIC BLOOD PRESSURE: 122 MMHG | DIASTOLIC BLOOD PRESSURE: 60 MMHG

## 2024-06-06 DIAGNOSIS — I10 BENIGN HYPERTENSION: ICD-10-CM

## 2024-06-06 DIAGNOSIS — Z00.00 WELL ADULT EXAM: Primary | ICD-10-CM

## 2024-06-06 DIAGNOSIS — E78.2 MIXED HYPERLIPIDEMIA: ICD-10-CM

## 2024-06-06 PROCEDURE — 90471 IMMUNIZATION ADMIN: CPT | Performed by: FAMILY MEDICINE

## 2024-06-06 PROCEDURE — 3074F SYST BP LT 130 MM HG: CPT | Performed by: FAMILY MEDICINE

## 2024-06-06 PROCEDURE — 90715 TDAP VACCINE 7 YRS/> IM: CPT | Performed by: FAMILY MEDICINE

## 2024-06-06 PROCEDURE — 3078F DIAST BP <80 MM HG: CPT | Performed by: FAMILY MEDICINE

## 2024-06-06 PROCEDURE — 99396 PREV VISIT EST AGE 40-64: CPT | Performed by: FAMILY MEDICINE

## 2024-06-06 RX ORDER — ROSUVASTATIN CALCIUM 5 MG/1
5 TABLET, COATED ORAL DAILY
Qty: 90 TABLET | Refills: 3 | Status: SHIPPED | OUTPATIENT
Start: 2024-06-06 | End: 2024-06-06

## 2024-06-06 RX ORDER — ROSUVASTATIN CALCIUM 5 MG/1
5 TABLET, COATED ORAL DAILY
Qty: 90 TABLET | Refills: 3 | Status: SHIPPED | OUTPATIENT
Start: 2024-06-06

## 2024-06-06 NOTE — PROGRESS NOTES
Ulices Oseguera (:  1970) is a 54 y.o. male,Established patient, here for evaluation of the following chief complaint(s):  Annual Exam      Assessment & Plan   ASSESSMENT/PLAN:  Ulices was seen today for annual exam.    Diagnoses and all orders for this visit:    Well adult exam  -     Lipid Panel; Future  -     Comprehensive Metabolic Panel; Future  Reviewed diet and exercise  Benign hypertension  Stable off amlodipine. Will monitor  Mixed hyperlipidemia  Low dose statin indicated  Other orders  -     Discontinue: rosuvastatin (CRESTOR) 5 MG tablet; Take 1 tablet by mouth daily  -     rosuvastatin (CRESTOR) 5 MG tablet; Take 1 tablet by mouth daily  -     Tdap, BOOSTRIX, (age 10 yrs+), IM         No follow-ups on file.         Subjective   SUBJECTIVE/OBJECTIVE:  HPI  Pt is a of 54 y.o. male comes in today with   Chief Complaint   Patient presents with    Annual Exam   Here for annual  Vitals:    24 0923   BP: 122/60   Pulse: 90   SpO2: 98%   Weight: 93.4 kg (206 lb)   Height: 1.727 m (5' 8\")       Past Medical History:Reviewed  Medications:Reviewed.  No Known Allergies   Social hx:Reviewed.  Social History     Tobacco Use   Smoking Status Former    Types: Cigarettes   Smokeless Tobacco Former    Types: Chew    Quit date: 1/10/2012        Review of Systems       Objective   Physical Exam  Constitutional:       Appearance: Normal appearance. He is well-developed.   HENT:      Head: Normocephalic and atraumatic.      Mouth/Throat:      Pharynx: Oropharynx is clear.   Eyes:      General: No scleral icterus.     Conjunctiva/sclera: Conjunctivae normal.   Neck:      Thyroid: No thyromegaly.   Cardiovascular:      Rate and Rhythm: Normal rate and regular rhythm.      Heart sounds: Normal heart sounds. No murmur heard.  Pulmonary:      Effort: Pulmonary effort is normal.      Breath sounds: Normal breath sounds. No wheezing or rales.   Abdominal:      General: Bowel sounds are normal. There is no

## 2024-12-20 ENCOUNTER — OFFICE VISIT (OUTPATIENT)
Dept: FAMILY MEDICINE CLINIC | Age: 54
End: 2024-12-20
Payer: COMMERCIAL

## 2024-12-20 VITALS
BODY MASS INDEX: 31.83 KG/M2 | HEART RATE: 63 BPM | HEIGHT: 68 IN | DIASTOLIC BLOOD PRESSURE: 93 MMHG | WEIGHT: 210 LBS | SYSTOLIC BLOOD PRESSURE: 147 MMHG | OXYGEN SATURATION: 97 %

## 2024-12-20 DIAGNOSIS — M54.50 CHRONIC BILATERAL LOW BACK PAIN WITHOUT SCIATICA: ICD-10-CM

## 2024-12-20 DIAGNOSIS — G89.29 CHRONIC NECK PAIN: ICD-10-CM

## 2024-12-20 DIAGNOSIS — G89.29 CHRONIC BILATERAL LOW BACK PAIN WITHOUT SCIATICA: ICD-10-CM

## 2024-12-20 DIAGNOSIS — L30.8 OTHER ECZEMA: Primary | ICD-10-CM

## 2024-12-20 DIAGNOSIS — Z83.2 FAMILY HISTORY OF FACTOR V DEFICIENCY: ICD-10-CM

## 2024-12-20 DIAGNOSIS — B35.6 TINEA CRURIS: ICD-10-CM

## 2024-12-20 DIAGNOSIS — E78.2 MIXED HYPERLIPIDEMIA: ICD-10-CM

## 2024-12-20 DIAGNOSIS — M54.2 CHRONIC NECK PAIN: ICD-10-CM

## 2024-12-20 PROCEDURE — 99214 OFFICE O/P EST MOD 30 MIN: CPT | Performed by: FAMILY MEDICINE

## 2024-12-20 RX ORDER — KETOCONAZOLE 20 MG/G
CREAM TOPICAL 2 TIMES DAILY
Qty: 30 G | Refills: 1 | Status: SHIPPED | OUTPATIENT
Start: 2024-12-20

## 2024-12-20 RX ORDER — TRIAMCINOLONE ACETONIDE 1 MG/G
OINTMENT TOPICAL
Qty: 80 G | Refills: 1 | Status: SHIPPED | OUTPATIENT
Start: 2024-12-20 | End: 2024-12-27

## 2024-12-20 SDOH — ECONOMIC STABILITY: FOOD INSECURITY: WITHIN THE PAST 12 MONTHS, THE FOOD YOU BOUGHT JUST DIDN'T LAST AND YOU DIDN'T HAVE MONEY TO GET MORE.: NEVER TRUE

## 2024-12-20 SDOH — ECONOMIC STABILITY: FOOD INSECURITY: WITHIN THE PAST 12 MONTHS, YOU WORRIED THAT YOUR FOOD WOULD RUN OUT BEFORE YOU GOT MONEY TO BUY MORE.: NEVER TRUE

## 2024-12-20 SDOH — ECONOMIC STABILITY: INCOME INSECURITY: HOW HARD IS IT FOR YOU TO PAY FOR THE VERY BASICS LIKE FOOD, HOUSING, MEDICAL CARE, AND HEATING?: NOT HARD AT ALL

## 2024-12-20 NOTE — PROGRESS NOTES
Ulices Oseguera (:  1970) is a 54 y.o. male,Established patient, here for evaluation of the following chief complaint(s):  Lower Back Pain, Skin Problem (On arms/6-8 weeks), Neck Pain, and Rash (Groin area)      Assessment & Plan   ASSESSMENT/PLAN:  Ulices was seen today for lower back pain, skin problem, neck pain and rash.    Diagnoses and all orders for this visit:    Other eczema  Not well controlled. Covering with topical  Tinea cruris  Acute, not improving.  Trial of ketoconazole  Family history of factor V deficiency  -     Factor 5 Activity; Future    Mixed hyperlipidemia  Stable on statin  Chronic neck pain and Chronic bilateral low back pain without sciatica  Not well controlled. Wants  to work on HEP      Other orders  -     triamcinolone (KENALOG) 0.1 % ointment; Apply topically 2 times daily.  -     ketoconazole (NIZORAL) 2 % cream; Apply topically 2 times daily Apply topically daily.         No follow-ups on file.         Subjective   SUBJECTIVE/OBJECTIVE:  HPI  Pt is a of 54 y.o. male comes in today with   Chief Complaint   Patient presents with    Lower Back Pain    Skin Problem     On arms  6-8 weeks    Neck Pain    Rash     Groin area     Neck and lower back pain.   Does a lot of lifting. Spending time in his car.  Arms have been itchy for 4-6 weeks.  Putting hydrocortisone which helps a little.  Rash in groin area.  Off and on for months.  Clotrimazole seemed to help initially but recurred.  Both sisters tested positive for factor v      Review of Systems       Objective   Physical Exam     Excoriated papules and dry skin ble  Groin circular patches not localized to folds    An electronic signature was used to authenticate this note.    --Virgilio Sweeney MD

## 2025-06-16 RX ORDER — ROSUVASTATIN CALCIUM 5 MG/1
5 TABLET, COATED ORAL DAILY
Qty: 90 TABLET | Refills: 3 | Status: SHIPPED | OUTPATIENT
Start: 2025-06-16

## 2025-06-16 NOTE — TELEPHONE ENCOUNTER
Medication:   Requested Prescriptions     Pending Prescriptions Disp Refills    rosuvastatin (CRESTOR) 5 MG tablet [Pharmacy Med Name: ROSUVASTATIN CALCIUM 5 MG TAB] 90 tablet 3     Sig: TAKE 1 TABLET BY MOUTH DAILY       Last Filled:  6/6/24    Patient Phone Number: [phone] (home) [phone] (work)    Last appt: 12/20/2024   Next appt: Visit date not found    Last Labs DM:   Lab Results   Component Value Date/Time    LABA1C 5.3 08/15/2018 12:00 AM     Last Lipid:   Lab Results   Component Value Date/Time    CHOL 225 07/07/2020 03:17 PM    TRIG 166 07/07/2020 03:17 PM    HDL 53 07/07/2020 03:17 PM     Last PSA:   Lab Results   Component Value Date/Time    PSA 0.71 02/10/2012 10:28 AM     Last Thyroid:   Lab Results   Component Value Date/Time    TSH 1.32 09/21/2012 09:04 AM

## 2025-07-01 ASSESSMENT — PATIENT HEALTH QUESTIONNAIRE - PHQ9
SUM OF ALL RESPONSES TO PHQ QUESTIONS 1-9: 3
9. THOUGHTS THAT YOU WOULD BE BETTER OFF DEAD, OR OF HURTING YOURSELF: NOT AT ALL
2. FEELING DOWN, DEPRESSED OR HOPELESS: NOT AT ALL
10. IF YOU CHECKED OFF ANY PROBLEMS, HOW DIFFICULT HAVE THESE PROBLEMS MADE IT FOR YOU TO DO YOUR WORK, TAKE CARE OF THINGS AT HOME, OR GET ALONG WITH OTHER PEOPLE: NOT DIFFICULT AT ALL
1. LITTLE INTEREST OR PLEASURE IN DOING THINGS: NOT AT ALL
8. MOVING OR SPEAKING SO SLOWLY THAT OTHER PEOPLE COULD HAVE NOTICED. OR THE OPPOSITE - BEING SO FIDGETY OR RESTLESS THAT YOU HAVE BEEN MOVING AROUND A LOT MORE THAN USUAL: NOT AT ALL
SUM OF ALL RESPONSES TO PHQ QUESTIONS 1-9: 3
5. POOR APPETITE OR OVEREATING: SEVERAL DAYS
7. TROUBLE CONCENTRATING ON THINGS, SUCH AS READING THE NEWSPAPER OR WATCHING TELEVISION: NOT AT ALL
6. FEELING BAD ABOUT YOURSELF - OR THAT YOU ARE A FAILURE OR HAVE LET YOURSELF OR YOUR FAMILY DOWN: NOT AT ALL
3. TROUBLE FALLING OR STAYING ASLEEP: SEVERAL DAYS
5. POOR APPETITE OR OVEREATING: SEVERAL DAYS
3. TROUBLE FALLING OR STAYING ASLEEP: SEVERAL DAYS
SUM OF ALL RESPONSES TO PHQ QUESTIONS 1-9: 3
4. FEELING TIRED OR HAVING LITTLE ENERGY: SEVERAL DAYS
7. TROUBLE CONCENTRATING ON THINGS, SUCH AS READING THE NEWSPAPER OR WATCHING TELEVISION: NOT AT ALL
SUM OF ALL RESPONSES TO PHQ QUESTIONS 1-9: 3
1. LITTLE INTEREST OR PLEASURE IN DOING THINGS: NOT AT ALL
9. THOUGHTS THAT YOU WOULD BE BETTER OFF DEAD, OR OF HURTING YOURSELF: NOT AT ALL
10. IF YOU CHECKED OFF ANY PROBLEMS, HOW DIFFICULT HAVE THESE PROBLEMS MADE IT FOR YOU TO DO YOUR WORK, TAKE CARE OF THINGS AT HOME, OR GET ALONG WITH OTHER PEOPLE: NOT DIFFICULT AT ALL
SUM OF ALL RESPONSES TO PHQ QUESTIONS 1-9: 3
4. FEELING TIRED OR HAVING LITTLE ENERGY: SEVERAL DAYS
8. MOVING OR SPEAKING SO SLOWLY THAT OTHER PEOPLE COULD HAVE NOTICED. OR THE OPPOSITE, BEING SO FIGETY OR RESTLESS THAT YOU HAVE BEEN MOVING AROUND A LOT MORE THAN USUAL: NOT AT ALL
6. FEELING BAD ABOUT YOURSELF - OR THAT YOU ARE A FAILURE OR HAVE LET YOURSELF OR YOUR FAMILY DOWN: NOT AT ALL
2. FEELING DOWN, DEPRESSED OR HOPELESS: NOT AT ALL

## 2025-07-02 ENCOUNTER — OFFICE VISIT (OUTPATIENT)
Dept: FAMILY MEDICINE CLINIC | Age: 55
End: 2025-07-02
Payer: COMMERCIAL

## 2025-07-02 VITALS
HEIGHT: 68 IN | WEIGHT: 201 LBS | DIASTOLIC BLOOD PRESSURE: 82 MMHG | BODY MASS INDEX: 30.46 KG/M2 | HEART RATE: 60 BPM | SYSTOLIC BLOOD PRESSURE: 136 MMHG | OXYGEN SATURATION: 98 %

## 2025-07-02 DIAGNOSIS — Z00.00 WELL ADULT EXAM: Primary | ICD-10-CM

## 2025-07-02 PROCEDURE — 99396 PREV VISIT EST AGE 40-64: CPT | Performed by: FAMILY MEDICINE

## 2025-07-02 SDOH — ECONOMIC STABILITY: FOOD INSECURITY: WITHIN THE PAST 12 MONTHS, YOU WORRIED THAT YOUR FOOD WOULD RUN OUT BEFORE YOU GOT MONEY TO BUY MORE.: NEVER TRUE

## 2025-07-02 SDOH — ECONOMIC STABILITY: FOOD INSECURITY: WITHIN THE PAST 12 MONTHS, THE FOOD YOU BOUGHT JUST DIDN'T LAST AND YOU DIDN'T HAVE MONEY TO GET MORE.: NEVER TRUE

## 2025-07-02 ASSESSMENT — ENCOUNTER SYMPTOMS: RESPIRATORY NEGATIVE: 1

## 2025-07-02 NOTE — PROGRESS NOTES
Ulices Oseguera (:  1970) is a 55 y.o. male,Established patient, here for evaluation of the following chief complaint(s):  Annual Exam      Assessment & Plan   ASSESSMENT/PLAN:  Diagnoses and all orders for this visit:    Well adult exam     Reviewed diet and exercise      No follow-ups on file.         Subjective   SUBJECTIVE/OBJECTIVE:  HPI  Pt is a of 55 y.o. male comes in today with   Chief Complaint   Patient presents with    Annual Exam     Compliant with cpap.    Vitals:    25 1510   BP: 136/82   BP Site: Right Upper Arm   Patient Position: Sitting   BP Cuff Size: Medium Adult   Pulse: 60   SpO2: 98%   Weight: 91.2 kg (201 lb)   Height: 1.727 m (5' 8\")     Past Medical History:Reviewed  Medications:Reviewed.  No Known Allergies   Social hx:Reviewed.  Social History     Tobacco Use   Smoking Status Former    Average packs/day: 0.5 packs/day for 10.0 years (5.0 ttl pk-yrs)    Types: Cigarettes    Start date: 2005   Smokeless Tobacco Former    Types: Chew    Quit date: 1/10/2012       Review of Systems   Constitutional: Negative.    Respiratory: Negative.     Cardiovascular: Negative.           Objective   Physical Exam  Constitutional:       General: He is not in acute distress.     Appearance: Normal appearance. He is well-developed. He is not diaphoretic.   HENT:      Head: Normocephalic and atraumatic.      Mouth/Throat:      Mouth: Mucous membranes are moist.      Pharynx: Oropharynx is clear.   Eyes:      General: No scleral icterus.  Neck:      Thyroid: No thyromegaly.      Trachea: No tracheal deviation.   Cardiovascular:      Rate and Rhythm: Normal rate and regular rhythm.      Heart sounds: Normal heart sounds. No murmur heard.  Pulmonary:      Effort: Pulmonary effort is normal.      Breath sounds: Normal breath sounds. No wheezing or rales.   Musculoskeletal:      Cervical back: Normal range of motion and neck supple.   Lymphadenopathy:      Head:      Right side of head: No

## 2025-07-03 ENCOUNTER — RESULTS FOLLOW-UP (OUTPATIENT)
Dept: FAMILY MEDICINE CLINIC | Age: 55
End: 2025-07-03

## 2025-07-03 LAB
ALBUMIN: 5.1 G/DL (ref 3.8–4.9)
ALP BLD-CCNC: 82 IU/L (ref 44–121)
ALT SERPL-CCNC: 32 IU/L (ref 0–44)
AMBIGUOUS ABBREVIATION: NORMAL
AST SERPL-CCNC: 35 IU/L (ref 0–40)
BILIRUB SERPL-MCNC: 0.8 MG/DL (ref 0–1.2)
BUN / CREAT RATIO: 16 (ref 9–20)
BUN BLDV-MCNC: 16 MG/DL (ref 6–24)
CALCIUM SERPL-MCNC: 9.5 MG/DL (ref 8.7–10.2)
CHLORIDE BLD-SCNC: 102 MMOL/L (ref 96–106)
CHOLESTEROL, TOTAL: 175 MG/DL (ref 100–199)
CO2: 21 MMOL/L (ref 20–29)
CREAT SERPL-MCNC: 1 MG/DL (ref 0.76–1.27)
ESTIMATED GLOMERULAR FILTRATION RATE CREATININE EQUATION: 89 ML/MIN/1.73
GLOBULIN: 2.4 G/DL (ref 1.5–4.5)
GLUCOSE BLD-MCNC: 82 MG/DL (ref 70–99)
HDLC SERPL-MCNC: 56 MG/DL
LDL CHOLESTEROL: 96 MG/DL (ref 0–99)
POTASSIUM SERPL-SCNC: 4.1 MMOL/L (ref 3.5–5.2)
PROSTATE SPECIFIC ANTIGEN: 0.9 NG/ML (ref 0–4)
SODIUM BLD-SCNC: 140 MMOL/L (ref 134–144)
TOTAL PROTEIN: 7.5 G/DL (ref 6–8.5)
TRIGL SERPL-MCNC: 133 MG/DL (ref 0–149)
VLDLC SERPL CALC-MCNC: 23 MG/DL (ref 5–40)

## 2025-07-09 ENCOUNTER — TELEPHONE (OUTPATIENT)
Dept: FAMILY MEDICINE CLINIC | Age: 55
End: 2025-07-09

## 2025-07-09 NOTE — TELEPHONE ENCOUNTER
Patient called to confirm that his work physical form was sent to [employer]. He stated it is time sensitive. Please call patient to confirm.

## 2025-07-09 NOTE — TELEPHONE ENCOUNTER
Spoke with Pt and informed him there is no fax number and that we don't have a work phone that scan Qr codes